# Patient Record
Sex: MALE | Race: WHITE | NOT HISPANIC OR LATINO | Employment: UNEMPLOYED | URBAN - METROPOLITAN AREA
[De-identification: names, ages, dates, MRNs, and addresses within clinical notes are randomized per-mention and may not be internally consistent; named-entity substitution may affect disease eponyms.]

---

## 2017-07-05 ENCOUNTER — HOSPITAL ENCOUNTER (EMERGENCY)
Facility: HOSPITAL | Age: 37
Discharge: LEFT BEFORE DISCHARGE | End: 2017-07-05

## 2017-07-05 VITALS
WEIGHT: 265 LBS | TEMPERATURE: 99.1 F | SYSTOLIC BLOOD PRESSURE: 198 MMHG | HEART RATE: 132 BPM | OXYGEN SATURATION: 94 % | RESPIRATION RATE: 18 BRPM | BODY MASS INDEX: 32.95 KG/M2 | HEIGHT: 75 IN | DIASTOLIC BLOOD PRESSURE: 132 MMHG

## 2017-07-05 PROCEDURE — 99211 OFF/OP EST MAY X REQ PHY/QHP: CPT

## 2017-07-06 NOTE — ED TRIAGE NOTES
States his BP was high at [employer] 230/130 and thinks his calcium is high because he woke up last night shaking/ states he has an infected tooth dx 2 weeks ago and dentist wouldn't treat due to high blood pressure/ went back to PMD at that time and was placed back on BP meds at that time